# Patient Record
Sex: MALE | Race: WHITE | Employment: FULL TIME | URBAN - NONMETROPOLITAN AREA
[De-identification: names, ages, dates, MRNs, and addresses within clinical notes are randomized per-mention and may not be internally consistent; named-entity substitution may affect disease eponyms.]

---

## 2017-02-06 DIAGNOSIS — I10 ESSENTIAL HYPERTENSION: ICD-10-CM

## 2017-02-06 RX ORDER — ENALAPRIL MALEATE 20 MG/1
40 TABLET ORAL DAILY
Qty: 60 TABLET | Refills: 1 | Status: SHIPPED | OUTPATIENT
Start: 2017-02-06 | End: 2017-03-07 | Stop reason: SDUPTHER

## 2017-02-06 RX ORDER — SPIRONOLACTONE 25 MG/1
25 TABLET ORAL DAILY
Qty: 30 TABLET | Refills: 1 | Status: SHIPPED | OUTPATIENT
Start: 2017-02-06 | End: 2017-04-12 | Stop reason: SDUPTHER

## 2017-02-15 ENCOUNTER — OFFICE VISIT (OUTPATIENT)
Dept: CARDIOLOGY | Age: 62
End: 2017-02-15
Payer: COMMERCIAL

## 2017-02-15 VITALS
WEIGHT: 266 LBS | BODY MASS INDEX: 38.08 KG/M2 | DIASTOLIC BLOOD PRESSURE: 64 MMHG | HEIGHT: 70 IN | HEART RATE: 88 BPM | SYSTOLIC BLOOD PRESSURE: 120 MMHG

## 2017-02-15 DIAGNOSIS — I48.0 PAF (PAROXYSMAL ATRIAL FIBRILLATION) (HCC): Primary | ICD-10-CM

## 2017-02-15 DIAGNOSIS — I10 ESSENTIAL HYPERTENSION: Chronic | ICD-10-CM

## 2017-02-15 DIAGNOSIS — I34.0 MILD MITRAL REGURGITATION BY PRIOR ECHOCARDIOGRAM: ICD-10-CM

## 2017-02-15 DIAGNOSIS — I48.0 PAF (PAROXYSMAL ATRIAL FIBRILLATION) (HCC): ICD-10-CM

## 2017-02-15 LAB
ALBUMIN SERPL-MCNC: 4.3 G/DL (ref 3.5–5.2)
ALP BLD-CCNC: 67 U/L (ref 40–130)
ALT SERPL-CCNC: 18 U/L (ref 5–41)
ANION GAP SERPL CALCULATED.3IONS-SCNC: 17 MMOL/L (ref 7–19)
AST SERPL-CCNC: 19 U/L (ref 5–40)
BILIRUB SERPL-MCNC: 0.3 MG/DL (ref 0.2–1.2)
BUN BLDV-MCNC: 17 MG/DL (ref 8–23)
CALCIUM SERPL-MCNC: 9.2 MG/DL (ref 8.8–10.2)
CHLORIDE BLD-SCNC: 97 MMOL/L (ref 98–111)
CO2: 25 MMOL/L (ref 22–29)
CREAT SERPL-MCNC: 0.7 MG/DL (ref 0.5–1.2)
GFR NON-AFRICAN AMERICAN: >60
GLOBULIN: 3.1 G/DL
GLUCOSE BLD-MCNC: 82 MG/DL (ref 74–109)
POTASSIUM SERPL-SCNC: 4.3 MMOL/L (ref 3.5–5)
SODIUM BLD-SCNC: 139 MMOL/L (ref 136–145)
TOTAL PROTEIN: 7.4 G/DL (ref 6.6–8.7)

## 2017-02-15 PROCEDURE — 93000 ELECTROCARDIOGRAM COMPLETE: CPT | Performed by: NURSE PRACTITIONER

## 2017-02-15 PROCEDURE — 99214 OFFICE O/P EST MOD 30 MIN: CPT | Performed by: NURSE PRACTITIONER

## 2017-02-15 RX ORDER — CARVEDILOL 25 MG/1
25 TABLET ORAL 2 TIMES DAILY WITH MEALS
COMMUNITY
End: 2017-02-16 | Stop reason: SDUPTHER

## 2017-02-15 RX ORDER — AMIODARONE HYDROCHLORIDE 200 MG/1
200 TABLET ORAL DAILY
COMMUNITY
End: 2017-02-15 | Stop reason: CLARIF

## 2017-02-16 RX ORDER — CARVEDILOL 25 MG/1
25 TABLET ORAL 2 TIMES DAILY WITH MEALS
Qty: 60 TABLET | Refills: 3 | Status: SHIPPED | OUTPATIENT
Start: 2017-02-16 | End: 2017-06-22 | Stop reason: SDUPTHER

## 2017-03-07 DIAGNOSIS — I10 ESSENTIAL HYPERTENSION: ICD-10-CM

## 2017-03-07 RX ORDER — ENALAPRIL MALEATE 20 MG/1
40 TABLET ORAL DAILY
Qty: 60 TABLET | Refills: 5 | Status: SHIPPED | OUTPATIENT
Start: 2017-03-07 | End: 2017-09-07 | Stop reason: SDUPTHER

## 2017-03-22 ENCOUNTER — OFFICE VISIT (OUTPATIENT)
Dept: CARDIOLOGY | Age: 62
End: 2017-03-22
Payer: COMMERCIAL

## 2017-03-22 VITALS
DIASTOLIC BLOOD PRESSURE: 60 MMHG | SYSTOLIC BLOOD PRESSURE: 100 MMHG | WEIGHT: 270 LBS | HEIGHT: 70 IN | BODY MASS INDEX: 38.65 KG/M2

## 2017-03-22 DIAGNOSIS — I48.0 PAF (PAROXYSMAL ATRIAL FIBRILLATION) (HCC): Primary | ICD-10-CM

## 2017-03-22 DIAGNOSIS — I34.0 MILD MITRAL REGURGITATION BY PRIOR ECHOCARDIOGRAM: ICD-10-CM

## 2017-03-22 DIAGNOSIS — I10 ESSENTIAL HYPERTENSION: Chronic | ICD-10-CM

## 2017-03-22 PROCEDURE — 99212 OFFICE O/P EST SF 10 MIN: CPT | Performed by: INTERNAL MEDICINE

## 2017-04-12 DIAGNOSIS — I10 ESSENTIAL HYPERTENSION: ICD-10-CM

## 2017-04-12 RX ORDER — SPIRONOLACTONE 25 MG/1
25 TABLET ORAL DAILY
Qty: 30 TABLET | Refills: 5 | Status: SHIPPED | OUTPATIENT
Start: 2017-04-12 | End: 2017-10-13 | Stop reason: SDUPTHER

## 2017-06-07 DIAGNOSIS — I10 ESSENTIAL HYPERTENSION: Primary | Chronic | ICD-10-CM

## 2017-06-07 RX ORDER — HYDRALAZINE HYDROCHLORIDE 25 MG/1
25 TABLET, FILM COATED ORAL 3 TIMES DAILY
Qty: 120 TABLET | Refills: 5 | Status: SHIPPED | OUTPATIENT
Start: 2017-06-07 | End: 2018-06-20 | Stop reason: SDUPTHER

## 2017-06-23 RX ORDER — CARVEDILOL 25 MG/1
25 TABLET ORAL 2 TIMES DAILY WITH MEALS
Qty: 60 TABLET | Refills: 3 | Status: SHIPPED | OUTPATIENT
Start: 2017-06-23 | End: 2017-10-26 | Stop reason: SDUPTHER

## 2017-07-18 DIAGNOSIS — I10 ESSENTIAL HYPERTENSION: Primary | Chronic | ICD-10-CM

## 2017-07-18 RX ORDER — FUROSEMIDE 20 MG/1
20 TABLET ORAL DAILY
Qty: 30 TABLET | Refills: 5 | Status: SHIPPED | OUTPATIENT
Start: 2017-07-18 | End: 2018-02-23 | Stop reason: SDUPTHER

## 2017-09-07 DIAGNOSIS — I10 ESSENTIAL HYPERTENSION: ICD-10-CM

## 2017-09-07 RX ORDER — ENALAPRIL MALEATE 20 MG/1
40 TABLET ORAL DAILY
Qty: 60 TABLET | Refills: 5 | Status: SHIPPED | OUTPATIENT
Start: 2017-09-07 | End: 2018-03-16 | Stop reason: SDUPTHER

## 2017-09-27 ENCOUNTER — OFFICE VISIT (OUTPATIENT)
Dept: CARDIOLOGY | Age: 62
End: 2017-09-27
Payer: COMMERCIAL

## 2017-09-27 VITALS
WEIGHT: 265 LBS | BODY MASS INDEX: 37.94 KG/M2 | HEART RATE: 72 BPM | SYSTOLIC BLOOD PRESSURE: 120 MMHG | HEIGHT: 70 IN | DIASTOLIC BLOOD PRESSURE: 70 MMHG

## 2017-09-27 DIAGNOSIS — I48.0 PAF (PAROXYSMAL ATRIAL FIBRILLATION) (HCC): ICD-10-CM

## 2017-09-27 DIAGNOSIS — I10 ESSENTIAL HYPERTENSION: Primary | ICD-10-CM

## 2017-09-27 PROCEDURE — 99213 OFFICE O/P EST LOW 20 MIN: CPT | Performed by: INTERNAL MEDICINE

## 2017-10-13 DIAGNOSIS — I10 ESSENTIAL HYPERTENSION: ICD-10-CM

## 2017-10-13 RX ORDER — SPIRONOLACTONE 25 MG/1
25 TABLET ORAL DAILY
Qty: 30 TABLET | Refills: 5 | Status: SHIPPED | OUTPATIENT
Start: 2017-10-13 | End: 2018-04-19 | Stop reason: SDUPTHER

## 2017-10-26 RX ORDER — CARVEDILOL 25 MG/1
25 TABLET ORAL 2 TIMES DAILY WITH MEALS
Qty: 60 TABLET | Refills: 5 | Status: SHIPPED | OUTPATIENT
Start: 2017-10-26 | End: 2018-05-03 | Stop reason: SDUPTHER

## 2018-02-23 DIAGNOSIS — I10 ESSENTIAL HYPERTENSION: Chronic | ICD-10-CM

## 2018-02-23 RX ORDER — FUROSEMIDE 20 MG/1
20 TABLET ORAL DAILY
Qty: 30 TABLET | Refills: 5 | Status: SHIPPED | OUTPATIENT
Start: 2018-02-23 | End: 2018-02-27 | Stop reason: SDUPTHER

## 2018-02-26 DIAGNOSIS — I10 ESSENTIAL HYPERTENSION: Chronic | ICD-10-CM

## 2018-02-27 RX ORDER — FUROSEMIDE 20 MG/1
20 TABLET ORAL DAILY
Qty: 30 TABLET | Refills: 5 | Status: SHIPPED | OUTPATIENT
Start: 2018-02-27 | End: 2018-09-13 | Stop reason: SDUPTHER

## 2018-03-16 DIAGNOSIS — I10 ESSENTIAL HYPERTENSION: ICD-10-CM

## 2018-03-16 RX ORDER — ENALAPRIL MALEATE 20 MG/1
40 TABLET ORAL DAILY
Qty: 60 TABLET | Refills: 5 | Status: SHIPPED | OUTPATIENT
Start: 2018-03-16 | End: 2018-09-13 | Stop reason: SDUPTHER

## 2018-03-23 ENCOUNTER — OFFICE VISIT (OUTPATIENT)
Dept: CARDIOLOGY | Age: 63
End: 2018-03-23
Payer: COMMERCIAL

## 2018-03-23 VITALS
HEART RATE: 80 BPM | HEIGHT: 70 IN | BODY MASS INDEX: 38.65 KG/M2 | SYSTOLIC BLOOD PRESSURE: 128 MMHG | DIASTOLIC BLOOD PRESSURE: 70 MMHG | WEIGHT: 270 LBS

## 2018-03-23 DIAGNOSIS — I10 ESSENTIAL HYPERTENSION: ICD-10-CM

## 2018-03-23 DIAGNOSIS — I48.0 PAF (PAROXYSMAL ATRIAL FIBRILLATION) (HCC): Primary | ICD-10-CM

## 2018-03-23 LAB
ANION GAP SERPL CALCULATED.3IONS-SCNC: 12 MMOL/L (ref 7–19)
BUN BLDV-MCNC: 16 MG/DL (ref 8–23)
CALCIUM SERPL-MCNC: 9 MG/DL (ref 8.8–10.2)
CHLORIDE BLD-SCNC: 101 MMOL/L (ref 98–111)
CO2: 24 MMOL/L (ref 22–29)
CREAT SERPL-MCNC: 0.6 MG/DL (ref 0.5–1.2)
GFR NON-AFRICAN AMERICAN: >60
GLUCOSE BLD-MCNC: 99 MG/DL (ref 74–109)
POTASSIUM SERPL-SCNC: 4.8 MMOL/L (ref 3.5–5)
SODIUM BLD-SCNC: 137 MMOL/L (ref 136–145)

## 2018-03-23 PROCEDURE — 93000 ELECTROCARDIOGRAM COMPLETE: CPT | Performed by: NURSE PRACTITIONER

## 2018-03-23 PROCEDURE — 99213 OFFICE O/P EST LOW 20 MIN: CPT | Performed by: NURSE PRACTITIONER

## 2018-03-23 NOTE — PROGRESS NOTES
 Atrial fibrillation ([facility] [address] 6/21/2012    HTN (hypertension) 6/21/2012       Past Surgical History:   Procedure Laterality Date    TONSILLECTOMY         Family History   Problem Relation Age of Onset    Hypertension Mother     Hypertension Father        Social History     Social History    Marital status:      Spouse name: N/A    Number of children: N/A    Years of education: N/A     Occupational History    [employer] [address] [place] [address] Topics    Smoking status: Never Smoker    Smokeless tobacco: Never Used    Alcohol use Not on file    Drug use: Unknown    Sexual activity: Not on file     Other Topics Concern    Not on file     Social History Narrative    No narrative on file       Allergies   Allergen Reactions    Pcn [Penicillins]      Diarrhea    Minoxidil Rash         Current Outpatient Prescriptions:     enalapril (VASOTEC) 20 MG tablet, Take 2 tablets by mouth daily, Disp: 60 tablet, Rfl: 5    furosemide (LASIX) 20 MG tablet, take 1 tablet by mouth daily, Disp: 30 tablet, Rfl: 5    carvedilol (COREG) 25 MG tablet, Take 1 tablet by mouth 2 times daily (with meals), Disp: 60 tablet, Rfl: 5    spironolactone (ALDACTONE) 25 MG tablet, Take 1 tablet by mouth daily, Disp: 30 tablet, Rfl: 5    apixaban (ELIQUIS) 5 MG TABS tablet, Take 1 tablet by mouth 2 times daily, Disp: 60 tablet, Rfl: 5    hydrALAZINE (APRESOLINE) 25 MG tablet, Take 1 tablet by mouth 3 times daily, Disp: 120 tablet, Rfl: 5    aspirin 81 MG EC tablet, Take 81 mg by mouth daily. , Disp: , Rfl:     PE:  Vitals:    03/23/18 0945   BP: 128/70   Pulse: 80       Estimated body mass index is 38.74 kg/m² as calculated from the following:    Height as of this encounter: 5' 10\" (1.778 m). Weight as of this encounter: 270 lb (122.5 kg). Constitutional: He is oriented to person, place, and time. He appears well-developed and well-nourished in no acute distress.   Head: Normocephalic and atraumatic. Neck:  Neck supple without JVD present. Cardiovascular: Normal rate, irregular irregular rhythm, normal heart sounds. no murmur ascultated. No gallop and no friction rub.  no carotid bruits. no peripheral edema. Pulmonary/Chest:  Lungs clear to auscultation bilaterally without evidence of respiratory distress. He without wheezes. He without rales or ronchi. Musculoskeletal: Normal range of motion. Gait is normal no assitive device. Neurological: He is alert and oriented to person, place, and time. Skin: Skin is warm and dry without rash or pallor. Psychiatric: He has a normal mood and affect. His behavior is normal. Thought content normal.     Lab Results   Component Value Date    CREATININE 0.7 02/15/2017    CREATININE 1.0 03/11/2014    CREATININE 0.9 01/03/2013    CREATININE 0 01/03/2013    HGB 13.7 06/12/2012    HGB 14.4 06/11/2012    HGB 14.5 06/10/2012       ECG 03/23/18  Atrial fibrillation with controlled ventricular response      Assessment    1. PAF (paroxysmal atrial fibrillation) ([facility] [address]    2. Essential hypertension          Plan:    Continue current medications as prescribed. Stable Cardiovascular status  Check BMP with eliquis, lasix, and aldactone     Disposition - RTC in 6 months with Dr. Sohan Rizzo or sooner if needed      Please do not hesitate to contact me for any questions or concerns.     Sincerely yours,    FLORENTINO Broussard

## 2018-04-02 RX ORDER — APIXABAN 5 MG/1
TABLET, FILM COATED ORAL
Qty: 180 TABLET | Refills: 3 | Status: SHIPPED | OUTPATIENT
Start: 2018-04-02 | End: 2018-09-13 | Stop reason: SDUPTHER

## 2018-04-19 DIAGNOSIS — I10 ESSENTIAL HYPERTENSION: ICD-10-CM

## 2018-04-19 RX ORDER — SPIRONOLACTONE 25 MG/1
25 TABLET ORAL DAILY
Qty: 30 TABLET | Refills: 5 | Status: SHIPPED | OUTPATIENT
Start: 2018-04-19 | End: 2018-09-13 | Stop reason: SDUPTHER

## 2018-05-03 RX ORDER — CARVEDILOL 25 MG/1
25 TABLET ORAL 2 TIMES DAILY WITH MEALS
Qty: 60 TABLET | Refills: 5 | Status: SHIPPED | OUTPATIENT
Start: 2018-05-03 | End: 2018-09-13 | Stop reason: SDUPTHER

## 2018-06-20 DIAGNOSIS — I10 ESSENTIAL HYPERTENSION: Chronic | ICD-10-CM

## 2018-06-20 RX ORDER — HYDRALAZINE HYDROCHLORIDE 25 MG/1
25 TABLET, FILM COATED ORAL 3 TIMES DAILY
Qty: 120 TABLET | Refills: 5 | Status: SHIPPED | OUTPATIENT
Start: 2018-06-20 | End: 2018-09-13 | Stop reason: SDUPTHER

## 2018-09-13 DIAGNOSIS — I10 ESSENTIAL HYPERTENSION: ICD-10-CM

## 2018-09-13 RX ORDER — CARVEDILOL 25 MG/1
25 TABLET ORAL 2 TIMES DAILY WITH MEALS
Qty: 180 TABLET | Refills: 3 | Status: SHIPPED | OUTPATIENT
Start: 2018-09-13 | End: 2018-10-29 | Stop reason: SDUPTHER

## 2018-09-13 RX ORDER — FUROSEMIDE 20 MG/1
20 TABLET ORAL DAILY
Qty: 90 TABLET | Refills: 3 | Status: SHIPPED | OUTPATIENT
Start: 2018-09-13 | End: 2019-10-21

## 2018-09-13 RX ORDER — SPIRONOLACTONE 25 MG/1
25 TABLET ORAL DAILY
Qty: 90 TABLET | Refills: 3 | Status: SHIPPED | OUTPATIENT
Start: 2018-09-13 | End: 2019-09-10 | Stop reason: SDUPTHER

## 2018-09-13 RX ORDER — ENALAPRIL MALEATE 20 MG/1
40 TABLET ORAL DAILY
Qty: 180 TABLET | Refills: 3 | Status: SHIPPED | OUTPATIENT
Start: 2018-09-13 | End: 2019-09-10 | Stop reason: SDUPTHER

## 2018-09-13 RX ORDER — HYDRALAZINE HYDROCHLORIDE 25 MG/1
25 TABLET, FILM COATED ORAL 3 TIMES DAILY
Qty: 270 TABLET | Refills: 3 | Status: SHIPPED | OUTPATIENT
Start: 2018-09-13 | End: 2020-03-16 | Stop reason: SDUPTHER

## 2018-10-03 ENCOUNTER — OFFICE VISIT (OUTPATIENT)
Dept: CARDIOLOGY | Age: 63
End: 2018-10-03
Payer: COMMERCIAL

## 2018-10-03 VITALS
BODY MASS INDEX: 36.82 KG/M2 | SYSTOLIC BLOOD PRESSURE: 116 MMHG | HEIGHT: 71 IN | HEART RATE: 85 BPM | DIASTOLIC BLOOD PRESSURE: 64 MMHG | WEIGHT: 263 LBS

## 2018-10-03 DIAGNOSIS — I10 ESSENTIAL HYPERTENSION: Chronic | ICD-10-CM

## 2018-10-03 DIAGNOSIS — I48.20 CHRONIC ATRIAL FIBRILLATION (HCC): Chronic | ICD-10-CM

## 2018-10-03 DIAGNOSIS — I34.0 MILD MITRAL REGURGITATION BY PRIOR ECHOCARDIOGRAM: Primary | ICD-10-CM

## 2018-10-03 PROBLEM — I48.21 PERMANENT ATRIAL FIBRILLATION (HCC): Status: ACTIVE | Noted: 2017-02-15

## 2018-10-03 PROCEDURE — 93000 ELECTROCARDIOGRAM COMPLETE: CPT | Performed by: INTERNAL MEDICINE

## 2018-10-03 PROCEDURE — 99212 OFFICE O/P EST SF 10 MIN: CPT | Performed by: INTERNAL MEDICINE

## 2018-10-03 ASSESSMENT — ENCOUNTER SYMPTOMS
COUGH: 0
APNEA: 1
CHEST TIGHTNESS: 0
SHORTNESS OF BREATH: 1
WHEEZING: 0

## 2018-10-18 ENCOUNTER — TELEPHONE (OUTPATIENT)
Dept: CARDIOLOGY | Age: 63
End: 2018-10-18

## 2018-10-18 NOTE — TELEPHONE ENCOUNTER
pt cancelled echo due to cost he was told by pre-registration, pt would like to speak with someone to determine if he still needs the office visit on 11/02

## 2018-10-29 RX ORDER — CARVEDILOL 25 MG/1
25 TABLET ORAL 2 TIMES DAILY WITH MEALS
Qty: 180 TABLET | Refills: 3 | Status: SHIPPED | OUTPATIENT
Start: 2018-10-29 | End: 2020-05-04 | Stop reason: SDUPTHER

## 2019-09-09 DIAGNOSIS — I10 ESSENTIAL HYPERTENSION: ICD-10-CM

## 2019-09-10 RX ORDER — ENALAPRIL MALEATE 20 MG/1
40 TABLET ORAL DAILY
Qty: 180 TABLET | Refills: 3 | Status: SHIPPED | OUTPATIENT
Start: 2019-09-10 | End: 2020-09-09 | Stop reason: SDUPTHER

## 2019-09-10 RX ORDER — SPIRONOLACTONE 25 MG/1
25 TABLET ORAL DAILY
Qty: 90 TABLET | Refills: 3 | Status: SHIPPED | OUTPATIENT
Start: 2019-09-10 | End: 2020-09-09 | Stop reason: SDUPTHER

## 2019-10-21 ENCOUNTER — OFFICE VISIT (OUTPATIENT)
Dept: CARDIOLOGY | Age: 64
End: 2019-10-21
Payer: COMMERCIAL

## 2019-10-21 VITALS
WEIGHT: 281 LBS | BODY MASS INDEX: 40.23 KG/M2 | DIASTOLIC BLOOD PRESSURE: 70 MMHG | HEART RATE: 80 BPM | OXYGEN SATURATION: 98 % | HEIGHT: 70 IN | SYSTOLIC BLOOD PRESSURE: 110 MMHG

## 2019-10-21 DIAGNOSIS — I10 ESSENTIAL HYPERTENSION: ICD-10-CM

## 2019-10-21 DIAGNOSIS — I48.91 ATRIAL FIBRILLATION, UNSPECIFIED TYPE (HCC): Primary | ICD-10-CM

## 2019-10-21 PROCEDURE — 99213 OFFICE O/P EST LOW 20 MIN: CPT | Performed by: NURSE PRACTITIONER

## 2019-10-21 PROCEDURE — 93000 ELECTROCARDIOGRAM COMPLETE: CPT | Performed by: NURSE PRACTITIONER

## 2020-03-16 RX ORDER — HYDRALAZINE HYDROCHLORIDE 25 MG/1
25 TABLET, FILM COATED ORAL 3 TIMES DAILY
Qty: 270 TABLET | Refills: 3 | Status: SHIPPED | OUTPATIENT
Start: 2020-03-16 | End: 2021-05-06 | Stop reason: SDUPTHER

## 2020-05-04 RX ORDER — CARVEDILOL 25 MG/1
25 TABLET ORAL 2 TIMES DAILY WITH MEALS
Qty: 180 TABLET | Refills: 3 | Status: SHIPPED | OUTPATIENT
Start: 2020-05-04 | End: 2021-05-06 | Stop reason: SDUPTHER

## 2020-09-09 RX ORDER — ENALAPRIL MALEATE 20 MG/1
40 TABLET ORAL DAILY
Qty: 180 TABLET | Refills: 0 | Status: SHIPPED | OUTPATIENT
Start: 2020-09-09 | End: 2020-12-07

## 2020-09-09 RX ORDER — SPIRONOLACTONE 25 MG/1
25 TABLET ORAL DAILY
Qty: 90 TABLET | Refills: 0 | Status: SHIPPED | OUTPATIENT
Start: 2020-09-09 | End: 2020-12-07

## 2020-11-18 ENCOUNTER — OFFICE VISIT (OUTPATIENT)
Dept: CARDIOLOGY | Age: 65
End: 2020-11-18
Payer: COMMERCIAL

## 2020-11-18 VITALS
HEIGHT: 70 IN | HEART RATE: 85 BPM | DIASTOLIC BLOOD PRESSURE: 72 MMHG | SYSTOLIC BLOOD PRESSURE: 108 MMHG | BODY MASS INDEX: 39.08 KG/M2 | WEIGHT: 273 LBS

## 2020-11-18 PROCEDURE — 93000 ELECTROCARDIOGRAM COMPLETE: CPT | Performed by: INTERNAL MEDICINE

## 2020-11-18 PROCEDURE — 99213 OFFICE O/P EST LOW 20 MIN: CPT | Performed by: INTERNAL MEDICINE

## 2020-11-18 NOTE — PROGRESS NOTES
69-year-old gentleman with a history of hypertension, obstructive sleep apnea, familial coronary disease, and chronic atrial fibrillation returns for routine follow-up visit. Atrial fibrillation apparently previously discovered in 2012 with unsuccessful attempts to maintain sinus rhythm and chronic atrial fibrillation since at least 2014. He has been maintained on Eliquis in addition to which his hypertension is addressed with 20 of enalapril, 25 a spironolactone, 25 b.i.d. of carvedilol, and 25 t.i.d. of hydralazine. Clinically he relates some mild chronic dyspnea on exertion though she does not exercise on a regular basis. He denies chest discomfort suspicious for ischemia. Besides his hypertension, he has a strong family history of coronary disease [brother infarcted in his 42s] and his lipid status is apparently uncertain. On exam he carries 273 pounds in a 5 foot 10 inch frame. Pressure is 108/72 with an irregular pulse in the mid 80s. EOMs full, sclerae and conjunctiva normal. PERRLA. Mask in place. Trachea midline with no neck masses. Assessment of internal jugular veins reveals no elevation of central venous pressure at 45 degrees. Carotid pulses normal without delay or bruit. Thyroid normal to palpation. Chest exam reveals normal respiratory effort, no abnormal breath sounds and normal expiratory phase. No skin lesions seen. PMI normal.  Irregular rhythm without significant murmur. Abdominal obesity. Normal bowel sounds without palpable mass or bruit. No clubbing or acrocyanosis. Trace pretibial edema. General motor strength appears to be within normal limits. Normal range of motion with normal gait. Alert, oriented x 3, memory and cognition normal as reflected by history and conversation. EKG reveals atrial fibrillation with a ventricular response rate in mid 80s, right ventricular conduction delay, and diffuse low voltage. Assessment/plan:  1.   Coronary disease risk - strong family

## 2020-12-07 RX ORDER — ENALAPRIL MALEATE 20 MG/1
40 TABLET ORAL DAILY
Qty: 180 TABLET | Refills: 3 | Status: SHIPPED | OUTPATIENT
Start: 2020-12-07 | End: 2021-12-03 | Stop reason: SDUPTHER

## 2020-12-07 RX ORDER — SPIRONOLACTONE 25 MG/1
25 TABLET ORAL DAILY
Qty: 90 TABLET | Refills: 3 | Status: SHIPPED | OUTPATIENT
Start: 2020-12-07 | End: 2021-12-03 | Stop reason: SDUPTHER

## 2021-05-06 DIAGNOSIS — I10 ESSENTIAL HYPERTENSION: ICD-10-CM

## 2021-05-07 RX ORDER — HYDRALAZINE HYDROCHLORIDE 25 MG/1
25 TABLET, FILM COATED ORAL 3 TIMES DAILY
Qty: 270 TABLET | Refills: 3 | Status: SHIPPED | OUTPATIENT
Start: 2021-05-07 | End: 2022-06-07 | Stop reason: SDUPTHER

## 2021-05-07 RX ORDER — CARVEDILOL 25 MG/1
25 TABLET ORAL 2 TIMES DAILY WITH MEALS
Qty: 180 TABLET | Refills: 3 | Status: SHIPPED | OUTPATIENT
Start: 2021-05-07 | End: 2022-03-14 | Stop reason: SDUPTHER

## 2021-05-07 NOTE — TELEPHONE ENCOUNTER
From: Terry Lopez  To:  Office of FLORENTINO Rodríguez  Sent: 5/6/2021 8:44 PM CDT  Subject: Medication Renewal Request    Refills have been requested for the following medications:     carvedilol (COREG) 25 MG tablet FLORENTINO Rodríguez]    Preferred pharmacy: [address] Roberto Haskins, [address] [facility] [phone] [facility] [phone]      Medication renewals requested in this message routed separately:     hydrALAZINE (APRESOLINE) 25 MG tablet FLORENTINO Brown - NP]

## 2021-05-07 NOTE — TELEPHONE ENCOUNTER
From: Steve Sanchez  To:  Office of FLORENTINO Polanco NP  Sent: 5/6/2021 8:44 PM CDT  Subject: Medication Renewal Request    Refills have been requested for the following medications:     hydrALAZINE (APRESOLINE) 25 MG tablet FLORENTINO Polanco NP]    Preferred pharmacy: [address] [facility], [facility] [phone] - [phone]      Medication renewals requested in this message routed separately:     carvedilol (COREG) 25 MG tablet Olesya Sheehan, APRN]

## 2021-06-01 NOTE — TELEPHONE ENCOUNTER
From: Gladys King  To:  Office of [facility] FLORENTINO Maldonado CNP  Sent: 5/30/2021 7:51 PM CDT  Subject: Medication Renewal Request    Refills have been requested for the following medications:     apixaban (ELIQUIS) 5 MG TABS tablet [facility] FLORENTINO Maldonado CNP]    Preferred pharmacy: [address]

## 2021-12-03 DIAGNOSIS — I10 ESSENTIAL HYPERTENSION: ICD-10-CM

## 2021-12-03 RX ORDER — ENALAPRIL MALEATE 20 MG/1
40 TABLET ORAL DAILY
Qty: 180 TABLET | Refills: 0 | Status: SHIPPED | OUTPATIENT
Start: 2021-12-03 | End: 2021-12-16 | Stop reason: SDUPTHER

## 2021-12-03 RX ORDER — SPIRONOLACTONE 25 MG/1
25 TABLET ORAL DAILY
Qty: 90 TABLET | Refills: 3 | OUTPATIENT
Start: 2021-12-03 | End: 2022-12-03

## 2021-12-03 RX ORDER — ENALAPRIL MALEATE 20 MG/1
40 TABLET ORAL DAILY
Qty: 180 TABLET | Refills: 3 | OUTPATIENT
Start: 2021-12-03

## 2021-12-03 RX ORDER — SPIRONOLACTONE 25 MG/1
25 TABLET ORAL DAILY
Qty: 90 TABLET | Refills: 0 | Status: SHIPPED | OUTPATIENT
Start: 2021-12-03 | End: 2022-03-14 | Stop reason: SDUPTHER

## 2021-12-03 NOTE — TELEPHONE ENCOUNTER
From: Jeevan Venegas  To:  Office of Ximena Rucker  Sent: 12/2/2021 7:55 PM CST  Subject: Medication Renewal Request    Refills have been requested for the following medications:     enalapril (VASOTEC) 20 MG tablet [Savanah Velasquez, APRN - CNP]     spironolactone (ALDACTONE) 25 MG tablet Ximena Rucker, APRN - CNP]    Preferred pharmacy: [address] [place] [address], [address] [place] [address] [phone]      Medication renewals requested in this message routed separately:     apixaban (ELIQUIS) 5 MG TABS tablet Gabino Dominguez, APRN]

## 2021-12-16 ENCOUNTER — OFFICE VISIT (OUTPATIENT)
Dept: CARDIOLOGY CLINIC | Age: 66
End: 2021-12-16
Payer: COMMERCIAL

## 2021-12-16 VITALS
HEIGHT: 70 IN | WEIGHT: 279 LBS | DIASTOLIC BLOOD PRESSURE: 78 MMHG | HEART RATE: 76 BPM | SYSTOLIC BLOOD PRESSURE: 124 MMHG | BODY MASS INDEX: 39.94 KG/M2

## 2021-12-16 DIAGNOSIS — I48.21 PERMANENT ATRIAL FIBRILLATION (HCC): ICD-10-CM

## 2021-12-16 DIAGNOSIS — I10 ESSENTIAL HYPERTENSION: Primary | ICD-10-CM

## 2021-12-16 DIAGNOSIS — Z82.49 FAMILY HISTORY OF EARLY CAD: ICD-10-CM

## 2021-12-16 DIAGNOSIS — I34.0 MILD MITRAL REGURGITATION BY PRIOR ECHOCARDIOGRAM: ICD-10-CM

## 2021-12-16 PROCEDURE — 93000 ELECTROCARDIOGRAM COMPLETE: CPT | Performed by: CLINICAL NURSE SPECIALIST

## 2021-12-16 PROCEDURE — 99214 OFFICE O/P EST MOD 30 MIN: CPT | Performed by: CLINICAL NURSE SPECIALIST

## 2021-12-16 RX ORDER — FEXOFENADINE HCL 180 MG/1
180 TABLET ORAL DAILY
COMMUNITY

## 2021-12-16 RX ORDER — ENALAPRIL MALEATE 20 MG/1
40 TABLET ORAL DAILY
Qty: 180 TABLET | Refills: 3 | Status: SHIPPED | OUTPATIENT
Start: 2021-12-16 | End: 2022-03-14 | Stop reason: SDUPTHER

## 2021-12-16 NOTE — PATIENT INSTRUCTIONS
Stress echo and 2 d echo soon-hold the Coreg the morning off test and take afterward  Follow up in 1 year - DR Dempsey Shed if tests all ok   Call with any questions or concerns  Follow up with Medina Lamas MD for non cardiac problems  Report any new problems  Cardiovascular Fitness-Exercise as tolerated. Strive for 30 minutes of exercise most days of the week. Cardiac / Healthy Diet  Continue current medications as directed  Continue plan of treatment  It is always recommended that you bring your medications bottles with you to each visit - this is for your safety!

## 2021-12-16 NOTE — PROGRESS NOTES
[facility] Cardiology  [facility] [address]  [address]  Phone: [phone]  Fax: [phone]    OFFICE VISIT:  2021    Jann Ferreira - : 1955    Reason For Visit:  Indra Wills is a 74 y.o. male who is here for 1 Year Follow Up (No cardiac sx today to discuss) and Hypertension  History hypertension, CHIQUIS, familial coronary disease and chronic A. fib returns today for routine follow-up. He has been maintained on rate control and anticoagulation with Eliquis  He has CHIQUIS and wearing his CPAP  He is not aware of any arrhythmia    Subjective  Indra Wills denies exertional chest pain, shortness of breath, orthopnea, paroxysmal nocturnal dyspnea, syncope, presyncope, arrhythmia, edema and fatigue. The patient denies numbness or weakness to suggest cerebrovascular accident or transient ischemic attack. Medina Lamas MD is PCP and follows labs.   Jann Ferreira has the following history as recorded in [facility]:    Patient Active Problem List    Diagnosis Date Noted    Permanent atrial fibrillation ([facility] [address] 02/15/2017    Mild mitral regurgitation by prior echocardiogram 02/15/2017    Sleep apnea 2012    HTN (hypertension) 2012    Atrial fibrillation ([address] 2012     Past Medical History:   Diagnosis Date    Atrial fibrillation ([address] 2012    HTN (hypertension) 2012     Past Surgical History:   Procedure Laterality Date    TONSILLECTOMY       Family History   Problem Relation Age of Onset    Hypertension Mother     Hypertension Father     Heart Attack Brother      Social History     Tobacco Use    Smoking status: Never Smoker    Smokeless tobacco: Never Used   Substance Use Topics    Alcohol use: Not Currently      Current Outpatient Medications   Medication Sig Dispense Refill    fexofenadine (ALLEGRA) 180 MG tablet Take 180 mg by mouth daily      apixaban (ELIQUIS) 5 MG TABS tablet Take 1 tablet by mouth twice daily 60 tablet 5    enalapril (VASOTEC) 20 MG tablet Take 2 tablets by mouth daily 180 tablet 3    spironolactone (ALDACTONE) 25 MG tablet Take 1 tablet by mouth daily 90 tablet 0    carvedilol (COREG) 25 MG tablet Take 1 tablet by mouth 2 times daily (with meals) 180 tablet 3    hydrALAZINE (APRESOLINE) 25 MG tablet Take 1 tablet by mouth 3 times daily (Patient taking differently: Take 25 mg by mouth 2 times daily ) 270 tablet 3    aspirin 81 MG EC tablet Take 81 mg by mouth daily. No current facility-administered medications for this visit. Allergies: Pcn [penicillins] and Minoxidil    Review of Systems  Constitutional  no significant activity change, appetite change, or unexpected weight change. No fever, chills or diaphoresis. No fatigue. HEENT  no significant rhinorrhea or epistaxis. No tinnitus or significant hearing loss. Eyes  no sudden vision change or amaurosis. Respiratory  no significant wheezing, stridor, apnea or cough. No dyspnea on exertion or shortness of breath. Cardiovascular  no exertional chest pain, orthopnea or PND. No sensation of arrhythmia or slow heart rate. No claudication or leg edema. Gastrointestinal  no abdominal swelling or pain. No blood in stool. No severe constipation, diarrhea, nausea, or vomiting. Genitourinary  no difficulty urinating, dysuria, frequency, or urgency. No flank pain or hematuria. Musculoskeletal  no back pain, gait disturbance, or myalgia. Skin  no color change or rash. No pallor. No new surgical incision. Neurologic  no speech difficulty, facial asymmetry or lateralizing weakness. No seizures, presyncope, syncope, or significant dizziness. Hematologic  no easy bruising or excessive bleeding. Psychiatric  no severe anxiety or insomnia. No confusion. All other review of systems are negative. Objective  Vital Signs - /78   Pulse 76   Ht 5' 10\" (1.778 m)   Wt 279 lb (126.6 kg)   BMI 40.03 kg/m²   General - Jason is alert, cooperative, and pleasant.   Well groomed. No acute distress. Body habitus is obese. HEENT  The head is normocephalic. No circumoral cyanosis. Dentition is normal.   EYES -  No Xanthelasma, no arcus senilis, no conjunctival hemorrhages or discharge. Neck - Supple, without increased jugular venous pressures. No carotid bruits. No mass. Respiratory -expiratory wheezes on right lung no rales. Normal effort without use of accessory muscles. Cardiovascular  Heart has irregular rhythm and controlled rate. No murmurs, rubs or gallops. + pedal pulses and no varicosities. Abdominal -  Soft, nontender, nondistended. Bowel sounds are intact. Extremities - No clubbing, cyanosis, or  edema. Musculoskeletal -  No clubbing . No Osler's nodes. Gait normal .  No kyphosis or scoliosis. Skin -  no statis ulcers or dermatitis. Neurological - No focal signs are identified. Oriented to person, place and time. Psychiatric -  Appropriate affect and mood. Assessment:     Diagnosis Orders   1. Essential hypertension  EKG 12 lead    ECHO Exercise Stress Test    ECHO Complete 2D W Doppler W Color    enalapril (VASOTEC) 20 MG tablet   2. Mild mitral regurgitation by prior echocardiogram  ECHO Exercise Stress Test    ECHO Complete 2D W Doppler W Color   3. Permanent atrial fibrillation (HCC)  ECHO Exercise Stress Test    ECHO Complete 2D W Doppler W Color   4. Family history of early CAD  ECHO Exercise Stress Test    ECHO Complete 2D W Doppler W Color     Data:  BP Readings from Last 3 Encounters:   12/16/21 124/78   11/18/20 108/72   10/21/19 110/70    Pulse Readings from Last 3 Encounters:   12/16/21 76   11/18/20 85   10/21/19 80        Wt Readings from Last 3 Encounters:   12/16/21 279 lb (126.6 kg)   11/18/20 273 lb (123.8 kg)   10/21/19 281 lb (127.5 kg)   Blood pressure and heart rate well controlled    EKG today shows atrial fibrillation with a rate of 76  Medical management includes carvedilol, enalapril and hydralazine. Also taking spironolactone. Remains anticoagulated on Eliquis with no abnormal bleeding    recommended last year to have a stress test to evaluate for any myocardial ischemia. This was not done due to cost prohibitive    States he can walk on treadmill. We will get him set up for stress echo and 2D echo. He will get that scheduled sometime after the first of the year that is okay we will see him back in a year       Reviewed PCP recent notes   Reviewed recent labs     States taking medications as prescribed  Stable cardiovascular status. No evidence of overt heart failure, angina or dysrhythmia. 30 minutes were spent preparing, reviewing and seeing patient. All questions answered    Plan  Stress echo and 2 d echo soon-hold the Coreg the morning off test and take afterward  Follow up in 1 year - DR Tari Anand if tests all ok   Call with any questions or concerns  Follow up with Kong Welch MD for non cardiac problems  Report any new problems  Cardiovascular Fitness-Exercise as tolerated. Strive for 30 minutes of exercise most days of the week. Cardiac / Healthy Diet  Continue current medications as directed  Continue plan of treatment  It is always recommended that you bring your medications bottles with you to each visit - this is for your safety! FLORENTINO Hastings    EMR dragon/transcription disclaimer: Much of this encounter note is electronic transcription/translation of spoken language to printed tach. Electronic translation of spoken language may be erroneous, or at times, nonsensical words or phrases may be inadvertently transcribed.  Although, I have reviewed the note for such errors, some may still exist.

## 2022-03-05 DIAGNOSIS — I10 ESSENTIAL HYPERTENSION: ICD-10-CM

## 2022-03-10 DIAGNOSIS — I10 ESSENTIAL HYPERTENSION: ICD-10-CM

## 2022-03-10 RX ORDER — SPIRONOLACTONE 25 MG/1
25 TABLET ORAL DAILY
Qty: 90 TABLET | Refills: 0 | OUTPATIENT
Start: 2022-03-10 | End: 2023-03-10

## 2022-03-10 RX ORDER — ENALAPRIL MALEATE 20 MG/1
40 TABLET ORAL DAILY
Qty: 180 TABLET | Refills: 3 | OUTPATIENT
Start: 2022-03-10

## 2022-03-14 DIAGNOSIS — I10 ESSENTIAL HYPERTENSION: ICD-10-CM

## 2022-03-14 RX ORDER — SPIRONOLACTONE 25 MG/1
25 TABLET ORAL DAILY
Qty: 90 TABLET | Refills: 0 | Status: SHIPPED | OUTPATIENT
Start: 2022-03-14 | End: 2022-06-07 | Stop reason: SDUPTHER

## 2022-03-14 RX ORDER — ENALAPRIL MALEATE 20 MG/1
TABLET ORAL
Qty: 180 TABLET | Refills: 0 | OUTPATIENT
Start: 2022-03-14

## 2022-03-14 RX ORDER — ENALAPRIL MALEATE 20 MG/1
40 TABLET ORAL DAILY
Qty: 180 TABLET | Refills: 3 | Status: SHIPPED | OUTPATIENT
Start: 2022-03-14

## 2022-03-14 RX ORDER — SPIRONOLACTONE 25 MG/1
TABLET ORAL
Qty: 90 TABLET | Refills: 0 | OUTPATIENT
Start: 2022-03-14

## 2022-03-14 RX ORDER — CARVEDILOL 25 MG/1
25 TABLET ORAL 2 TIMES DAILY WITH MEALS
Qty: 180 TABLET | Refills: 3 | Status: SHIPPED | OUTPATIENT
Start: 2022-03-14

## 2022-06-07 DIAGNOSIS — I10 ESSENTIAL HYPERTENSION: ICD-10-CM

## 2022-06-08 RX ORDER — HYDRALAZINE HYDROCHLORIDE 25 MG/1
25 TABLET, FILM COATED ORAL 3 TIMES DAILY
Qty: 270 TABLET | Refills: 3 | Status: SHIPPED | OUTPATIENT
Start: 2022-06-08

## 2022-06-08 RX ORDER — SPIRONOLACTONE 25 MG/1
25 TABLET ORAL DAILY
Qty: 90 TABLET | Refills: 3 | Status: SHIPPED | OUTPATIENT
Start: 2022-06-08 | End: 2023-06-08

## 2023-01-31 DIAGNOSIS — I48.21 PERMANENT ATRIAL FIBRILLATION (HCC): Primary | ICD-10-CM

## 2023-02-28 ENCOUNTER — TELEPHONE (OUTPATIENT)
Dept: CARDIOLOGY CLINIC | Age: 68
End: 2023-02-28

## 2023-03-01 ENCOUNTER — OFFICE VISIT (OUTPATIENT)
Dept: CARDIOLOGY CLINIC | Age: 68
End: 2023-03-01
Payer: COMMERCIAL

## 2023-03-01 VITALS
HEART RATE: 78 BPM | HEIGHT: 70 IN | DIASTOLIC BLOOD PRESSURE: 64 MMHG | WEIGHT: 273 LBS | BODY MASS INDEX: 39.08 KG/M2 | SYSTOLIC BLOOD PRESSURE: 118 MMHG

## 2023-03-01 DIAGNOSIS — I10 ESSENTIAL HYPERTENSION: ICD-10-CM

## 2023-03-01 DIAGNOSIS — I48.21 PERMANENT ATRIAL FIBRILLATION (HCC): Primary | ICD-10-CM

## 2023-03-01 DIAGNOSIS — Z82.49 FAMILY HISTORY OF EARLY CAD: ICD-10-CM

## 2023-03-01 DIAGNOSIS — E78.2 MIXED HYPERLIPIDEMIA: ICD-10-CM

## 2023-03-01 PROCEDURE — 1123F ACP DISCUSS/DSCN MKR DOCD: CPT | Performed by: INTERNAL MEDICINE

## 2023-03-01 PROCEDURE — 99214 OFFICE O/P EST MOD 30 MIN: CPT | Performed by: INTERNAL MEDICINE

## 2023-03-01 PROCEDURE — 93000 ELECTROCARDIOGRAM COMPLETE: CPT | Performed by: INTERNAL MEDICINE

## 2023-03-01 PROCEDURE — 3074F SYST BP LT 130 MM HG: CPT | Performed by: INTERNAL MEDICINE

## 2023-03-01 PROCEDURE — 3078F DIAST BP <80 MM HG: CPT | Performed by: INTERNAL MEDICINE

## 2023-03-01 NOTE — PROGRESS NOTES
HISTORY  67-year-old gentleman with a history of hypertension, obstructive sleep apnea, familial coronary disease, and chronic atrial fibrillation returns for follow-up.  His rhythm disturbance was first discovered in 2012 with repeated unsuccessful attempts to recover and maintain sinus rhythm.  Consequently he has been covered with Eliquis since chronic atrial fibrillation defined in 2014.  His blood pressure has been well controlled but his lipids have not been addressed with the last profile in our records from 2012 demonstrating an LDL of 110.  Last seen in our clinic in November 2020 stress test was ordered but never happened.  On return today he tells me he walks on a regular basis with no change in exercise tolerance, no unusual shortness of breath, no symptoms of dysrhythmia, and no chest discomfort.  He uses his CPAP nightly.  He has been vaccinated and boosted once for COVID-19.    PHYSICAL EXAM  On exam he carries 273 pounds on a 5 foot 10 inch frame.  Pressure is 118/64 with a pulse of 78. EOMs full, sclerae and conjunctiva normal. PERRLA. Mask in place. Trachea midline with no neck masses. Assessment of internal jugular veins reveals no elevation of central venous pressure at 45 degrees. Carotid pulses normal without delay or bruit. Thyroid normal to palpation. Chest exam reveals normal respiratory effort, no abnormal breath sounds and normal expiratory phase. No skin lesions seen. PMI normal.  Variable S1, S2 normal without murmur or jesusita or click and irregular rhythm. obese abdomen.  Normal bowel sounds without palpable mass or bruit. No clubbing or acrocyanosis.  Trace to 1+ pretibial edema.  General motor strength appears to be within normal limits. Normal range of motion with normal gait. Alert, oriented x 3, memory and cognition normal as reflected by history and conversation.  EKG reveals atrial fibrillation with controlled ventricular response rate of 78 along with right ventricular  conduction delay and diffuse low voltage [no change from 2019 tracing]. ASSESSMENT/PLAN:   Dyslipidemia -we will obtain lipid profile. Likely to require statin coverage given his risk profile. Hypertension -adequate control. Continue Aldactone, hydralazine, enalapril, and carvedilol. Coronary disease risk -significant. We will obtain calcium scoring to determine whether stress testing indicated in this asymptomatic patient with multiple risk factors. Continue carvedilol and aspirin  Obstructive sleep apnea -continue nocturnal CPAP  Pandemic response -vaccinated and boosted but deserves most recent booster.

## 2023-03-02 DIAGNOSIS — I10 ESSENTIAL HYPERTENSION: ICD-10-CM

## 2023-03-02 DIAGNOSIS — I48.21 PERMANENT ATRIAL FIBRILLATION (HCC): ICD-10-CM

## 2023-03-06 DIAGNOSIS — I48.21 PERMANENT ATRIAL FIBRILLATION (HCC): ICD-10-CM

## 2023-03-06 DIAGNOSIS — I10 ESSENTIAL HYPERTENSION: ICD-10-CM

## 2023-03-06 RX ORDER — ENALAPRIL MALEATE 20 MG/1
40 TABLET ORAL DAILY
Qty: 180 TABLET | Refills: 3 | Status: SHIPPED | OUTPATIENT
Start: 2023-03-06

## 2023-03-06 RX ORDER — ENALAPRIL MALEATE 20 MG/1
40 TABLET ORAL DAILY
Qty: 180 TABLET | Refills: 3 | OUTPATIENT
Start: 2023-03-06

## 2023-04-26 ENCOUNTER — SCHEDULED TELEPHONE ENCOUNTER (OUTPATIENT)
Dept: CARDIOLOGY CLINIC | Age: 68
End: 2023-04-26
Payer: MEDICARE

## 2023-04-26 DIAGNOSIS — Z82.49 FAMILY HISTORY OF EARLY CAD: ICD-10-CM

## 2023-04-26 DIAGNOSIS — I48.21 PERMANENT ATRIAL FIBRILLATION (HCC): Primary | ICD-10-CM

## 2023-04-26 DIAGNOSIS — I10 ESSENTIAL HYPERTENSION: ICD-10-CM

## 2023-04-26 DIAGNOSIS — E78.2 MIXED HYPERLIPIDEMIA: ICD-10-CM

## 2023-04-26 PROCEDURE — 99441 PR PHYS/QHP TELEPHONE EVALUATION 5-10 MIN: CPT | Performed by: CLINICAL NURSE SPECIALIST

## 2023-04-26 PROCEDURE — 1123F ACP DISCUSS/DSCN MKR DOCD: CPT | Performed by: CLINICAL NURSE SPECIALIST

## 2023-04-26 RX ORDER — CARVEDILOL 25 MG/1
25 TABLET ORAL 2 TIMES DAILY WITH MEALS
Qty: 180 TABLET | Refills: 3 | Status: SHIPPED | OUTPATIENT
Start: 2023-04-26

## 2023-04-26 NOTE — PROGRESS NOTES
Tera Perez is a 69 y.o. male evaluated via telephone on 4/26/2023. Tera Perez was evaluated through a patient-initiated, synchronous (real-time) audio only encounter. He (or guardian if applicable) is aware that it is a billable service, which includes applicable co-pays, with coverage as determined by his insurance carrier. This visit was conducted with the patient's (and/or Thomos Fragmin guardian's) verbal consent. He has not had a related appointment within my department in the past 7 days or scheduled within the next 24 hours. The patient was located in a state where the provider was licensed to provide care. The patient was located at: Home: [address] [address]  The provider was located at: Facility (Appt Dept): [address] [place] [address]    Total Time: minutes: 5-10 minutes    Note: not billable if this call serves to triage the patient into an appointment for the relevant concern      FLORENTINO May     Tera Perez is a 69 y.o. male with the following history as recorded in [facility]:    History hypertension, CHIQUIS, familial coronary disease and chronic atrial fibrillation. Previous unsuccessful attempts to maintain sinus rhythm. Has been maintained on good rate control and anticoagulated on Eliquis. Recent office visit with Dr. Saniya Pinto. Recommended calcium scoring and cholesterol evaluation.   Phone visit today to discuss results  Patient Active Problem List    Diagnosis Date Noted    Permanent atrial fibrillation ([facility] [address] 02/15/2017    Mild mitral regurgitation by prior echocardiogram 02/15/2017    Sleep apnea 12/03/2012    HTN (hypertension) 06/21/2012    Atrial fibrillation ([facility] [address] 06/21/2012     Current Outpatient Medications   Medication Sig Dispense Refill    carvedilol (COREG) 25 MG tablet Take 1 tablet by mouth 2 times daily (with meals) 180 tablet 3    enalapril (VASOTEC) 20 MG tablet Take 2 tablets by mouth daily 180 tablet 3    apixaban (ELIQUIS) 5

## 2023-04-27 ENCOUNTER — TELEPHONE (OUTPATIENT)
Dept: CARDIOLOGY CLINIC | Age: 68
End: 2023-04-27

## 2023-04-27 DIAGNOSIS — E78.2 MIXED HYPERLIPIDEMIA: ICD-10-CM

## 2023-06-05 DIAGNOSIS — I10 ESSENTIAL HYPERTENSION: ICD-10-CM

## 2023-06-05 RX ORDER — HYDRALAZINE HYDROCHLORIDE 25 MG/1
25 TABLET, FILM COATED ORAL 3 TIMES DAILY
Qty: 270 TABLET | Refills: 3 | Status: SHIPPED | OUTPATIENT
Start: 2023-06-05

## 2023-06-05 RX ORDER — SPIRONOLACTONE 25 MG/1
25 TABLET ORAL DAILY
Qty: 90 TABLET | Refills: 3 | Status: SHIPPED | OUTPATIENT
Start: 2023-06-05 | End: 2024-06-04

## 2023-08-12 DIAGNOSIS — I48.21 PERMANENT ATRIAL FIBRILLATION (HCC): ICD-10-CM

## 2024-01-31 DIAGNOSIS — I48.21 PERMANENT ATRIAL FIBRILLATION (HCC): ICD-10-CM

## 2024-02-28 DIAGNOSIS — I10 ESSENTIAL HYPERTENSION: ICD-10-CM

## 2024-03-01 RX ORDER — ENALAPRIL MALEATE 20 MG/1
40 TABLET ORAL DAILY
Qty: 180 TABLET | Refills: 3 | Status: SHIPPED | OUTPATIENT
Start: 2024-03-01

## 2024-03-01 RX ORDER — HYDRALAZINE HYDROCHLORIDE 25 MG/1
25 TABLET, FILM COATED ORAL 3 TIMES DAILY
Qty: 270 TABLET | Refills: 3 | Status: SHIPPED | OUTPATIENT
Start: 2024-03-01

## 2024-04-25 ENCOUNTER — OFFICE VISIT (OUTPATIENT)
Dept: CARDIOLOGY CLINIC | Age: 69
End: 2024-04-25
Payer: MEDICARE

## 2024-04-25 VITALS
SYSTOLIC BLOOD PRESSURE: 118 MMHG | HEART RATE: 73 BPM | BODY MASS INDEX: 43.95 KG/M2 | WEIGHT: 307 LBS | HEIGHT: 70 IN | DIASTOLIC BLOOD PRESSURE: 78 MMHG

## 2024-04-25 DIAGNOSIS — I10 ESSENTIAL HYPERTENSION: Primary | ICD-10-CM

## 2024-04-25 DIAGNOSIS — I48.21 PERMANENT ATRIAL FIBRILLATION (HCC): ICD-10-CM

## 2024-04-25 PROCEDURE — 3078F DIAST BP <80 MM HG: CPT | Performed by: CLINICAL NURSE SPECIALIST

## 2024-04-25 PROCEDURE — 93000 ELECTROCARDIOGRAM COMPLETE: CPT | Performed by: CLINICAL NURSE SPECIALIST

## 2024-04-25 PROCEDURE — 99213 OFFICE O/P EST LOW 20 MIN: CPT | Performed by: CLINICAL NURSE SPECIALIST

## 2024-04-25 PROCEDURE — 1123F ACP DISCUSS/DSCN MKR DOCD: CPT | Performed by: CLINICAL NURSE SPECIALIST

## 2024-04-25 PROCEDURE — 1036F TOBACCO NON-USER: CPT | Performed by: CLINICAL NURSE SPECIALIST

## 2024-04-25 PROCEDURE — G8427 DOCREV CUR MEDS BY ELIG CLIN: HCPCS | Performed by: CLINICAL NURSE SPECIALIST

## 2024-04-25 PROCEDURE — 3074F SYST BP LT 130 MM HG: CPT | Performed by: CLINICAL NURSE SPECIALIST

## 2024-04-25 PROCEDURE — G8417 CALC BMI ABV UP PARAM F/U: HCPCS | Performed by: CLINICAL NURSE SPECIALIST

## 2024-04-25 PROCEDURE — 3017F COLORECTAL CA SCREEN DOC REV: CPT | Performed by: CLINICAL NURSE SPECIALIST

## 2024-04-25 NOTE — PROGRESS NOTES
[facility] Cardiology  [address] [address]  Phone: [phone]  Fax: [phone]    OFFICE VISIT:  2024    Jason Luna - : 1955    Reason For Visit:  Jason is a 68 y.o. male who is here for 1 Year Follow Up (No symptoms   ), Hypertension, and Atrial Fibrillation  History hypertension, CHIQUIS, familial coronary disease and chronic atrial fibrillation. Previous unsuccessful attempts to maintain sinus rhythm. Has been maintained on good rate control and anticoagulated on Eliquis.   Had calcium scoring last year that was 0.  Returns today for routine follow-up    He has CHIQUIS with CPAP and uses it regularly  He denies any cardiac complaints.  He still walks or works in his yard daily        Subjective  Jason denies exertional chest pain, shortness of breath, orthopnea, paroxysmal nocturnal dyspnea, syncope, presyncope, arrhythmia, edema and fatigue.  The patient denies numbness or weakness to suggest cerebrovascular accident or transient ischemic attack.    Clary Quintero APRN - NP is PCP and follows labs.  Jason Luna has the following history as recorded in [facility]:    Patient Active Problem List    Diagnosis Date Noted    Permanent atrial fibrillation (HCC) 02/15/2017    Mild mitral regurgitation by prior echocardiogram 02/15/2017    Sleep apnea 2012    HTN (hypertension) 2012    Atrial fibrillation (HCC) 2012     Past Medical History:   Diagnosis Date    Atrial fibrillation (HCC) 2012    HTN (hypertension) 2012     Past Surgical History:   Procedure Laterality Date    TONSILLECTOMY       Family History   Problem Relation Age of Onset    Hypertension Mother     Hypertension Father     Heart Attack Brother      Social History     Tobacco Use    Smoking status: Never    Smokeless tobacco: Never   Substance Use Topics    Alcohol use: Not Currently      Current Outpatient Medications   Medication Sig Dispense Refill    enalapril (VASOTEC) 20 MG tablet Take 2

## 2024-04-25 NOTE — PATIENT INSTRUCTIONS
Watch for any abnormal bleeding  Maintain good blood pressure control-goal<130/80 at rest  Maintain good cholesterol control LDL goal<70 with arterial disease  If you are diabetic work to keep/obtain hemoglobin A1c< 7    Follow up in 1 year   Call with any questions or concerns  Follow up with Clary Quintero APRN - NP for non cardiac problems  Report any new problems  Cardiovascular Fitness-Exercise as tolerated.  Strive for 30 minutes of exercise most days of the week.    Cardiac / Healthy Diet- Avoid processed high fat foods, maintain low sodium/salt   Continue current medications as directed  Continue plan of treatment  It is always recommended that you bring your medications bottles with you to each visit - this is for your safety!

## 2024-04-29 RX ORDER — CARVEDILOL 25 MG/1
25 TABLET ORAL 2 TIMES DAILY WITH MEALS
Qty: 180 TABLET | Refills: 3 | Status: SHIPPED | OUTPATIENT
Start: 2024-04-29

## 2024-05-30 DIAGNOSIS — I10 ESSENTIAL HYPERTENSION: ICD-10-CM

## 2024-05-31 RX ORDER — SPIRONOLACTONE 25 MG/1
25 TABLET ORAL DAILY
Qty: 90 TABLET | Refills: 3 | Status: SHIPPED | OUTPATIENT
Start: 2024-05-31 | End: 2025-05-31

## 2025-02-17 DIAGNOSIS — I48.21 PERMANENT ATRIAL FIBRILLATION (HCC): ICD-10-CM

## 2025-02-24 DIAGNOSIS — I10 ESSENTIAL HYPERTENSION: ICD-10-CM

## 2025-02-24 RX ORDER — ENALAPRIL MALEATE 20 MG/1
40 TABLET ORAL DAILY
Qty: 180 TABLET | Refills: 3 | Status: SHIPPED | OUTPATIENT
Start: 2025-02-24

## 2025-04-18 ENCOUNTER — OFFICE VISIT (OUTPATIENT)
Dept: CARDIOLOGY CLINIC | Age: 70
End: 2025-04-18
Payer: MEDICARE

## 2025-04-18 VITALS
SYSTOLIC BLOOD PRESSURE: 142 MMHG | DIASTOLIC BLOOD PRESSURE: 90 MMHG | HEIGHT: 70 IN | WEIGHT: 308 LBS | BODY MASS INDEX: 44.09 KG/M2 | HEART RATE: 83 BPM

## 2025-04-18 DIAGNOSIS — I48.21 PERMANENT ATRIAL FIBRILLATION (HCC): ICD-10-CM

## 2025-04-18 DIAGNOSIS — I10 ESSENTIAL HYPERTENSION: Primary | ICD-10-CM

## 2025-04-18 PROCEDURE — 1123F ACP DISCUSS/DSCN MKR DOCD: CPT | Performed by: CLINICAL NURSE SPECIALIST

## 2025-04-18 PROCEDURE — 3077F SYST BP >= 140 MM HG: CPT | Performed by: CLINICAL NURSE SPECIALIST

## 2025-04-18 PROCEDURE — 4004F PT TOBACCO SCREEN RCVD TLK: CPT | Performed by: CLINICAL NURSE SPECIALIST

## 2025-04-18 PROCEDURE — 1159F MED LIST DOCD IN RCRD: CPT | Performed by: CLINICAL NURSE SPECIALIST

## 2025-04-18 PROCEDURE — 1160F RVW MEDS BY RX/DR IN RCRD: CPT | Performed by: CLINICAL NURSE SPECIALIST

## 2025-04-18 PROCEDURE — 99213 OFFICE O/P EST LOW 20 MIN: CPT | Performed by: CLINICAL NURSE SPECIALIST

## 2025-04-18 PROCEDURE — G8427 DOCREV CUR MEDS BY ELIG CLIN: HCPCS | Performed by: CLINICAL NURSE SPECIALIST

## 2025-04-18 PROCEDURE — 3017F COLORECTAL CA SCREEN DOC REV: CPT | Performed by: CLINICAL NURSE SPECIALIST

## 2025-04-18 PROCEDURE — G8417 CALC BMI ABV UP PARAM F/U: HCPCS | Performed by: CLINICAL NURSE SPECIALIST

## 2025-04-18 PROCEDURE — 93000 ELECTROCARDIOGRAM COMPLETE: CPT | Performed by: CLINICAL NURSE SPECIALIST

## 2025-04-18 PROCEDURE — 3080F DIAST BP >= 90 MM HG: CPT | Performed by: CLINICAL NURSE SPECIALIST

## 2025-04-18 NOTE — PROGRESS NOTES
[facility]  [address] [address]  Phone: [phone]  Fax: [phone]    OFFICE VISIT:  2025    Jason Luna - : 1955    Reason For Visit:  Jason is a 69 y.o. male who is here for Follow-up, Atrial Fibrillation, and Hypertension  History hypertension, CHIQUIS, familial coronary disease and chronic atrial fibrillation. Previous unsuccessful attempts to maintain sinus rhythm. Has been maintained on good rate control and anticoagulated on Eliquis.   Had calcium scoring last year that was 0.  Returns today for routine follow-up    He has CHIQUIS with CPAP and uses it regularly  He denies any cardiac complaints.  He still walks or works in his yard daily  He was less active in the fall because of a knee sprain.      Subjective  Jason denies exertional chest pain, shortness of breath, orthopnea, paroxysmal nocturnal dyspnea, syncope, presyncope, arrhythmia, edema and fatigue.  The patient denies numbness or weakness to suggest cerebrovascular accident or transient ischemic attack.    Clary Quintero APRN - NP is PCP and follows labs.  Jason Luna has the following history as recorded in [facility]:    Patient Active Problem List    Diagnosis Date Noted    Permanent atrial fibrillation (HCC) 02/15/2017    Mild mitral regurgitation by prior echocardiogram 02/15/2017    Sleep apnea 2012    HTN (hypertension) 2012    Atrial fibrillation (HCC) 2012     Past Medical History:   Diagnosis Date    Atrial fibrillation (HCC) 2012    HTN (hypertension) 2012     Past Surgical History:   Procedure Laterality Date    TONSILLECTOMY       Family History   Problem Relation Age of Onset    Hypertension Mother     Hypertension Father     Heart Attack Brother      Social History     Tobacco Use    Smoking status: Never    Smokeless tobacco: Never   Substance Use Topics    Alcohol use: Not Currently      Current Outpatient Medications   Medication Sig Dispense Refill

## 2025-04-22 RX ORDER — CARVEDILOL 25 MG/1
25 TABLET ORAL 2 TIMES DAILY WITH MEALS
Qty: 180 TABLET | Refills: 3 | Status: SHIPPED | OUTPATIENT
Start: 2025-04-22

## 2025-05-10 DIAGNOSIS — I48.21 PERMANENT ATRIAL FIBRILLATION (HCC): ICD-10-CM

## 2025-05-12 RX ORDER — APIXABAN 5 MG/1
5 TABLET, FILM COATED ORAL 2 TIMES DAILY
Qty: 60 TABLET | Refills: 5 | Status: SHIPPED | OUTPATIENT
Start: 2025-05-12

## 2025-05-29 DIAGNOSIS — I10 ESSENTIAL HYPERTENSION: ICD-10-CM

## 2025-05-30 RX ORDER — HYDRALAZINE HYDROCHLORIDE 25 MG/1
25 TABLET, FILM COATED ORAL 3 TIMES DAILY
Qty: 270 TABLET | Refills: 3 | Status: SHIPPED | OUTPATIENT
Start: 2025-05-30

## 2025-05-30 RX ORDER — SPIRONOLACTONE 25 MG/1
25 TABLET ORAL DAILY
Qty: 90 TABLET | Refills: 3 | Status: SHIPPED | OUTPATIENT
Start: 2025-05-30 | End: 2026-05-30